# Patient Record
Sex: MALE | Race: WHITE | NOT HISPANIC OR LATINO | URBAN - NONMETROPOLITAN AREA
[De-identification: names, ages, dates, MRNs, and addresses within clinical notes are randomized per-mention and may not be internally consistent; named-entity substitution may affect disease eponyms.]

---

## 2018-08-20 PROBLEM — H52.03: Noted: 2018-08-20

## 2018-08-20 PROBLEM — H16.223: Noted: 2017-08-14

## 2018-08-20 PROBLEM — H35.373: Noted: 2017-08-14

## 2018-08-20 PROBLEM — H35.373: Noted: 2021-11-04

## 2018-08-20 PROBLEM — Z96.1: Noted: 2021-11-04

## 2018-08-20 PROBLEM — H35.3131: Noted: 2021-11-04

## 2018-08-20 PROBLEM — H52.4: Noted: 2018-08-20

## 2018-08-20 PROBLEM — H26.492: Noted: 2017-08-14

## 2018-08-20 PROBLEM — H52.223: Noted: 2018-08-20

## 2018-08-20 PROBLEM — H52.03: Noted: 2021-11-04

## 2018-08-20 PROBLEM — H16.223: Noted: 2021-11-04

## 2018-08-20 PROBLEM — Z96.1: Noted: 2017-08-14

## 2018-08-20 PROBLEM — H26.492: Noted: 2021-11-04

## 2019-04-02 ENCOUNTER — IMPORTED ENCOUNTER (OUTPATIENT)
Dept: URBAN - NONMETROPOLITAN AREA CLINIC 1 | Facility: CLINIC | Age: 79
End: 2019-04-02

## 2019-04-02 PROCEDURE — 92014 COMPRE OPH EXAM EST PT 1/>: CPT

## 2019-04-02 NOTE — PATIENT DISCUSSION
Early ERM OU stable.-Discussed findings of exam in detail with the patient.-Discussed the signs of worsening of the disease. Fely Rodriguezer Pt should return ASAP if he notices any significant changes in [place] to call the office to be seen. s/p PCIOL-Stable PCIOL s/p YAG Caps OD.-Monitor. 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 Discussed with patient that we would not notice a significant improvement if he were to have the YAG laser done at this time. -Explained symptoms of advancing PCO. -Continue to monitor for now. Pt will notify us if any new symptoms develop. Dry Eyes OU. Pt using Systane Ultra OU TID continue. Recomment increase use of tears OU PRN with increased frequency of dry eye symptoms. Myopia/Astigmatism/Hyperopia OU. RTC: 1yr ROMARIO; 's Notes: MAC OCT - 8/20/18DFE- 8/20/18

## 2020-11-05 ENCOUNTER — IMPORTED ENCOUNTER (OUTPATIENT)
Dept: URBAN - NONMETROPOLITAN AREA CLINIC 1 | Facility: CLINIC | Age: 80
End: 2020-11-05

## 2020-11-05 PROCEDURE — 92015 DETERMINE REFRACTIVE STATE: CPT

## 2020-11-05 PROCEDURE — 99213 OFFICE O/P EST LOW 20 MIN: CPT

## 2020-11-05 NOTE — PATIENT DISCUSSION
Early ERM OU stable. - Discussed findings of exam in detail with the patient. - Discussed the signs of worsening of the disease. .- Pt should return ASAP if he notices any significant changes in [place] to call the office to be seen. s/p PCIOL- Stable PCIOL s/p YAG Caps OD.- Monitor. Early PCO OS - uniform no complaints- Pt not yet symptomatic. Discussed with patient that we would not notice a significant improvement if he were to have the YAG laser done at this time. - Explained symptoms of advancing PCO.- Continue to monitor for now. Pt will notify us if any new symptoms develop. Dry Eyes OU.- Pt using Systane Ultra OU TID continue. Myopia/Astigmatism/Hyperopia OU. - MR done today new glasses Rx given; 's Notes: MAC OCT - 8/20/18DFE- 8/20/18

## 2021-05-06 ENCOUNTER — IMPORTED ENCOUNTER (OUTPATIENT)
Dept: URBAN - NONMETROPOLITAN AREA CLINIC 1 | Facility: CLINIC | Age: 81
End: 2021-05-06

## 2021-05-06 PROCEDURE — 99213 OFFICE O/P EST LOW 20 MIN: CPT

## 2021-05-12 ENCOUNTER — IMPORTED ENCOUNTER (OUTPATIENT)
Dept: URBAN - NONMETROPOLITAN AREA CLINIC 1 | Facility: CLINIC | Age: 81
End: 2021-05-12

## 2021-05-12 PROCEDURE — 92134 CPTRZ OPH DX IMG PST SGM RTA: CPT

## 2021-05-12 NOTE — PATIENT DISCUSSION
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 - Discussed the signs of worsening of the disease. .- Pt should return ASAP if he notices any significant changes in [place] to call the office to be seen. - Unable to perform mac OCT today; machine down. - Order mac OCT next availble - RTC in 6 months for follow ups/p PCIOL- Stable PCIOL s/p YAG Caps OD.- Monitor. Early PCO OS - uniform no complaints- Pt not yet symptomatic. Discussed with patient that we would not notice a significant improvement if he were to have the YAG laser done at this time. - Explained symptoms of advancing PCO.- Continue to monitor for now. Pt will notify us if any new symptoms develop. Dry Eyes OU.- Pt using Systane Ultra OU TID continue. Myopia/Astigmatism/Hyperopia OU.- Continue to monitor; 's Notes: MAC OCT - 8/20/18DFE- 8/20/18

## 2021-11-04 ENCOUNTER — IMPORTED ENCOUNTER (OUTPATIENT)
Dept: URBAN - NONMETROPOLITAN AREA CLINIC 1 | Facility: CLINIC | Age: 81
End: 2021-11-04

## 2021-11-04 PROBLEM — H16.223: Noted: 2021-11-04

## 2021-11-04 PROBLEM — H52.4: Noted: 2018-08-20

## 2021-11-04 PROBLEM — H35.373: Noted: 2021-11-04

## 2021-11-04 PROBLEM — Z96.1: Noted: 2021-11-04

## 2021-11-04 PROBLEM — H52.03: Noted: 2021-11-04

## 2021-11-04 PROBLEM — H26.492: Noted: 2021-11-04

## 2021-11-04 PROBLEM — H35.3131: Noted: 2021-11-04

## 2021-11-04 PROCEDURE — 92015 DETERMINE REFRACTIVE STATE: CPT

## 2021-11-04 PROCEDURE — 92014 COMPRE OPH EXAM EST PT 1/>: CPT

## 2021-11-04 NOTE — PATIENT DISCUSSION
Trace ERM OU/Dry ARMD OU- Discussed findings of exam in detail with the patient. - Discussed the signs of worsening of the disease. .- Pt should return ASAP if he notices any significant changes in [address] to call the office to be seen. - OCT MAC reviewed w/pt -Order OCT MAC at next visits/p PCIOL- Stable PCIOL s/p YAG Caps OD.- Early PCO OS- Continue to monitor for now. Pt will notify us if any new symptoms develop. Dry Eyes OU.- Continue Systane Ultra OU TIDMyopia/Astigmatism/Hyperopia OU.-New Rx dispensed- Continue to monitor; 's Notes: MAC OCT -05/12/21DFE- 11/04/21

## 2022-04-09 ASSESSMENT — VISUAL ACUITY
OS_SC: 20/25
OD_CC: 20/30
OS_SC: 20/20
OD_SC: 20/30
OU_SC: 20/20
OD_SC: 20/30
OS_CC: 20/30
OU_CC: 20/30
OS_SC: 20/25
OS_SC: 20/20
OD_SC: 20/30+2
OD_SC: 20/20

## 2022-04-09 ASSESSMENT — TONOMETRY
OS_IOP_MMHG: 20
OD_IOP_MMHG: 20
OS_IOP_MMHG: 18
OD_IOP_MMHG: 18
OD_IOP_MMHG: 17
OS_IOP_MMHG: 17
OS_IOP_MMHG: 17
OD_IOP_MMHG: 18

## 2022-06-09 ENCOUNTER — ESTABLISHED PATIENT (OUTPATIENT)
Dept: RURAL CLINIC 1 | Facility: CLINIC | Age: 82
End: 2022-06-09

## 2022-06-09 DIAGNOSIS — H35.3131: ICD-10-CM

## 2022-06-09 DIAGNOSIS — H35.373: ICD-10-CM

## 2022-06-09 PROCEDURE — 92134 CPTRZ OPH DX IMG PST SGM RTA: CPT

## 2022-06-09 PROCEDURE — 99213 OFFICE O/P EST LOW 20 MIN: CPT

## 2022-06-09 ASSESSMENT — TONOMETRY
OS_IOP_MMHG: 15
OD_IOP_MMHG: 15

## 2022-06-09 ASSESSMENT — VISUAL ACUITY
OD_PH: 20/40
OS_CC: 20/25
OD_CC: 20/40
OU_CC: 20/25

## 2022-06-09 NOTE — PATIENT DISCUSSION
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

## 2022-08-24 ENCOUNTER — EMERGENCY VISIT (OUTPATIENT)
Dept: RURAL CLINIC 1 | Facility: CLINIC | Age: 82
End: 2022-08-24

## 2022-08-24 DIAGNOSIS — H35.3131: ICD-10-CM

## 2022-08-24 DIAGNOSIS — H16.223: ICD-10-CM

## 2022-08-24 PROCEDURE — 99214 OFFICE O/P EST MOD 30 MIN: CPT

## 2022-08-24 ASSESSMENT — VISUAL ACUITY
OS_CC: 20/20
OU_CC: 20/20
OD_CC: 20/25

## 2023-06-15 ENCOUNTER — ESTABLISHED PATIENT (OUTPATIENT)
Dept: RURAL CLINIC 1 | Facility: CLINIC | Age: 83
End: 2023-06-15

## 2023-06-15 DIAGNOSIS — H35.3131: ICD-10-CM

## 2023-06-15 DIAGNOSIS — H35.373: ICD-10-CM

## 2023-06-15 PROCEDURE — 92134 CPTRZ OPH DX IMG PST SGM RTA: CPT

## 2023-06-15 PROCEDURE — 92014 COMPRE OPH EXAM EST PT 1/>: CPT

## 2023-06-15 ASSESSMENT — VISUAL ACUITY
OU_CC: 20/20
OD_PH: 20/40
OD_CC: 20/40
OS_CC: 20/20

## 2023-06-15 ASSESSMENT — TONOMETRY
OD_IOP_MMHG: 17
OS_IOP_MMHG: 17

## 2024-06-17 ENCOUNTER — ESTABLISHED PATIENT (OUTPATIENT)
Dept: RURAL CLINIC 1 | Facility: CLINIC | Age: 84
End: 2024-06-17

## 2024-06-17 DIAGNOSIS — H35.373: ICD-10-CM

## 2024-06-17 DIAGNOSIS — H35.3131: ICD-10-CM

## 2024-06-17 DIAGNOSIS — H26.492: ICD-10-CM

## 2024-06-17 DIAGNOSIS — H18.593: ICD-10-CM

## 2024-06-17 PROCEDURE — 92134 CPTRZ OPH DX IMG PST SGM RTA: CPT

## 2024-06-17 PROCEDURE — 92014 COMPRE OPH EXAM EST PT 1/>: CPT

## 2024-06-17 ASSESSMENT — TONOMETRY
OS_IOP_MMHG: 17
OD_IOP_MMHG: 17

## 2024-06-17 ASSESSMENT — VISUAL ACUITY
OS_CC: 20/25-2
OD_CC: 20/40-1
OU_CC: 20/25

## 2025-07-21 ENCOUNTER — COMPREHENSIVE EXAM (OUTPATIENT)
Age: 85
End: 2025-07-21

## 2025-07-21 DIAGNOSIS — H18.593: ICD-10-CM

## 2025-07-21 DIAGNOSIS — H35.373: ICD-10-CM

## 2025-07-21 DIAGNOSIS — Z96.1: ICD-10-CM

## 2025-07-21 DIAGNOSIS — H52.4: ICD-10-CM

## 2025-07-21 DIAGNOSIS — H16.223: ICD-10-CM

## 2025-07-21 DIAGNOSIS — H35.3112: ICD-10-CM

## 2025-07-21 DIAGNOSIS — H35.3121: ICD-10-CM

## 2025-07-21 DIAGNOSIS — H52.223: ICD-10-CM

## 2025-07-21 DIAGNOSIS — H52.13: ICD-10-CM

## 2025-07-21 PROCEDURE — 99214 OFFICE O/P EST MOD 30 MIN: CPT

## 2025-07-21 PROCEDURE — 92134 CPTRZ OPH DX IMG PST SGM RTA: CPT

## 2025-07-21 PROCEDURE — 92015 DETERMINE REFRACTIVE STATE: CPT
